# Patient Record
Sex: FEMALE | Race: WHITE | NOT HISPANIC OR LATINO | Employment: FULL TIME | URBAN - METROPOLITAN AREA
[De-identification: names, ages, dates, MRNs, and addresses within clinical notes are randomized per-mention and may not be internally consistent; named-entity substitution may affect disease eponyms.]

---

## 2019-06-03 ENCOUNTER — OFFICE VISIT (OUTPATIENT)
Dept: NEUROLOGY | Facility: CLINIC | Age: 34
End: 2019-06-03

## 2019-06-03 VITALS
WEIGHT: 256 LBS | DIASTOLIC BLOOD PRESSURE: 84 MMHG | SYSTOLIC BLOOD PRESSURE: 144 MMHG | HEIGHT: 61 IN | BODY MASS INDEX: 48.33 KG/M2 | OXYGEN SATURATION: 98 % | HEART RATE: 83 BPM

## 2019-06-03 DIAGNOSIS — G47.33 OBSTRUCTIVE SLEEP APNEA: ICD-10-CM

## 2019-06-03 DIAGNOSIS — G47.8 SLEEP PARALYSIS: Primary | ICD-10-CM

## 2019-06-03 PROCEDURE — 99204 OFFICE O/P NEW MOD 45 MIN: CPT | Performed by: PSYCHIATRY & NEUROLOGY

## 2019-06-03 NOTE — PROGRESS NOTES
Subjective:     Patient ID: Radha Lemus is a 33 y.o. female.    Ms. Lemus is a 33 year old right handed female with a h/o anxiety, headaches, and hypothyroidism who is seen in consultation at the request of Briana Dickson for the evaluation of dysphasia.  Had an episode 3-4 weeks ago.  Laid down to take a nap.  Woke up and couldn't talk.  Took a few minutes to be able to talk.  Never before.  Occurred around 5-6 pm.  Had woke up with an alarm.  Whole body was paralyzed.  Was really scary for her.  Was really tearful when she was able to talk.  Has never had a panic attack.  Boyfriend was around and witnessed the episode.  He said that she seemed confused and afraid when she was able to talk.  Hasn't happened again.  Works 2 jobs, on call .  Working 60-70 hours a week.  PCP feels that it may be stress.  Patient feels that it may be contributing.  Older sister  at age 36, related to blood clots.  No med changes.  No h/o seizures.  No family history.  No h/o FS.  No major head trauma.  No CNS infections.  Was aware the whole time.  Episode lasted 5 minutes or less.  Alarm kept going off because she couldn't turn it off.  No associated urinary incontinence, tongue biting, myalgias.  Has trouble falling asleep if she has a lot on her mind.  Once asleep, stays asleep.  Not refreshed in the am.  Does snore.  Has never had a sleep study.  Sometimes gets night sweats.  Occasionally will wake up with a headache.      ESS:  1. Sitting and reading? 2  2. Watching TV? 3  3. Sitting inactive in a public place? 0  4. As a passenger in a car for an hour without a break? 2  5. Lying down to rest in the afternoon when able? 2  6. Sitting and talking to someone? 0  7. Sitting quietly after lunch without EtOH? 1  8. In a car while stopped for a few minutes in traffic? 0  Total: 10    I reviewed the patient's records.  I reviewed Briana Dickson's note from 19.  It reports a possible episode of sleep paralysis.   Referral to neurology was made.        The following portions of the patient's history were reviewed and updated as appropriate: allergies, current medications, past family history, past medical history, past social history, past surgical history and problem list.    Review of Systems   Constitutional: Negative for activity change, appetite change and fatigue.   HENT: Negative for ear pain, facial swelling and trouble swallowing.    Eyes: Negative for photophobia, pain and visual disturbance.   Respiratory: Negative for choking, chest tightness and shortness of breath.    Cardiovascular: Negative for chest pain, palpitations and leg swelling.   Gastrointestinal: Negative for abdominal pain, constipation and nausea.   Endocrine: Negative for polydipsia, polyphagia and polyuria.   Genitourinary: Negative for difficulty urinating, frequency and urgency.   Musculoskeletal: Negative for back pain, gait problem and neck pain.   Skin: Negative for color change, rash and wound.   Allergic/Immunologic: Negative for environmental allergies, food allergies and immunocompromised state.   Neurological: Positive for speech difficulty and headaches. Negative for dizziness, tremors, seizures, syncope, facial asymmetry, weakness, light-headedness and numbness.   Hematological: Negative for adenopathy. Does not bruise/bleed easily.   Psychiatric/Behavioral: Positive for confusion. Negative for agitation, behavioral problems, decreased concentration, dysphoric mood, hallucinations, self-injury, sleep disturbance and suicidal ideas. The patient is not nervous/anxious and is not hyperactive.     I reviewed the ROS documented by the MA.      Objective:    Neurologic Exam    Physical Exam   Constitutional:  Vital signs reviewed.  No apparent distress.  Well groomed.  Eyes:  No injection, no icterus.  Fundoscopic exam performed.  No papilledema appreciated bilaterally.   Respiratory:  Normal effort.  Clear to auscultation  bilaterally.  Cardiovascular:  Regular rate and rhythm.  No murmurs.  No carotid bruits. Symmetric radial pulses.  Musculoskeletal: Normal station.  Gait steady.  Normal arm swing.  Patient able to walk on heels and toes.  Tandem gait intact.  Romberg negative.  Muscle tone and bulk normal.  Strength is 5/5 in the bilateral upper and lower extremities proximally and distally unless otherwise specified in the neurological exam.  Skin:  No rashes.  Warm, dry, and intact.  Psychiatric:  Good mood.  Normal affect.    Neurologic:  Mental status-  The patient is alert and oriented to person, place and time. Attention/concentration is within normal limits.  Speech is fluent without dysarthria.  The patient is able to name, repeat and follow complex commands without difficulty.  Immediate memory and delayed recall intact (3/3 words immediate and after 4 minutes).  Fund of knowledge normal.  Cranial nerves- Pupils equally round and reactive to light with intact accomodation.  Visual fields intact.  Extraocular movements intact.  Facial sensation intact.  Smile symmetric.  Hearing intact to finger-rub bilaterally.  Palate elevates symmetrically.  SCM and trapezius are 5/5 bilaterally.  Tongue is midline.  Motor-  See musculoskeletal above.  No tremor.  Reflexes- 2+ in the bilateral triceps, biceps, brachioradialis, patellar and achilles.  Toes down-going bilaterally.  Sensation- Intact to pinprick and vibration in bilateral upper and lower extremities symmetrically.  Coordination- Intact to finger to nose and heel knee shin bilaterally.  Intact rapid alternating movements bilaterally.  Gait- See musculoskeletal exam above.     Assessment/Plan:  Ms. Lemus is a 33 year old right handed female with a h/o anxiety, headaches and hypothyroidism who presents to the neurology clinic today for evaluation of sleep paralysis.    1.  Sleep paralysis- I agree that her clinical presentation seems to fit best with that diagnosis.  No  other symptoms concerning for narcolepsy.  Can be seen more commonly in mood disorders, with sleep deprivation or with stress.  Not uncommon to normally occur a couple of times throughout lifetime.  No further diagnostics recommended.    2.  BRIAN- The patient has several risk factors for BRIAN.  Recommend further evaluation with HST.     Problems Addressed this Visit     None      Visit Diagnoses     Sleep paralysis    -  Primary    Obstructive sleep apnea        Relevant Orders    Home Sleep Study

## 2019-07-03 ENCOUNTER — TELEPHONE (OUTPATIENT)
Dept: NEUROLOGY | Facility: CLINIC | Age: 34
End: 2019-07-03

## 2019-07-03 NOTE — TELEPHONE ENCOUNTER
Unfortunately not.  The only way to evaluate for sleep apnea, is with a sleep study.  And I ordered a home study which is more cost effective than an in lab study.  Thanks!

## 2019-07-08 ENCOUNTER — APPOINTMENT (OUTPATIENT)
Dept: SLEEP MEDICINE | Facility: HOSPITAL | Age: 34
End: 2019-07-08